# Patient Record
Sex: FEMALE | Race: WHITE | NOT HISPANIC OR LATINO | Employment: UNEMPLOYED | URBAN - METROPOLITAN AREA
[De-identification: names, ages, dates, MRNs, and addresses within clinical notes are randomized per-mention and may not be internally consistent; named-entity substitution may affect disease eponyms.]

---

## 2017-03-16 ENCOUNTER — TELEPHONE (OUTPATIENT)
Dept: OBSTETRICS AND GYNECOLOGY | Facility: CLINIC | Age: 37
End: 2017-03-16

## 2017-03-16 RX ORDER — IBUPROFEN 800 MG/1
400 TABLET ORAL EVERY 8 HOURS PRN
Qty: 100 TABLET | Refills: 0 | Status: SHIPPED | OUTPATIENT
Start: 2017-03-16 | End: 2017-07-28 | Stop reason: SDUPTHER

## 2017-03-16 NOTE — TELEPHONE ENCOUNTER
----- Message from Mini Rodriges sent at 3/16/2017  4:02 PM EDT -----  Regarding: Prescription Question  Contact: [phone]  I had endometrial ablation in September. Dr. Bautista prescribed ibuprofen 800mg. I do not have any more of this medication and have been having severe cramping around the time of when my menstrual cycle should be. Could you please call me in some ibuprofen 800mg? Thank you

## 2017-07-31 RX ORDER — IBUPROFEN 800 MG/1
TABLET ORAL
Qty: 45 TABLET | Refills: 0 | Status: SHIPPED | OUTPATIENT
Start: 2017-07-31

## 2019-03-11 ENCOUNTER — TRANSCRIBE ORDERS (OUTPATIENT)
Dept: ADMINISTRATIVE | Facility: HOSPITAL | Age: 39
End: 2019-03-11

## 2019-03-11 DIAGNOSIS — R13.10 DYSPHAGIA, UNSPECIFIED TYPE: Primary | ICD-10-CM

## 2019-03-15 ENCOUNTER — HOSPITAL ENCOUNTER (OUTPATIENT)
Dept: GENERAL RADIOLOGY | Facility: HOSPITAL | Age: 39
Discharge: HOME | End: 2019-03-15
Admitting: INTERNAL MEDICINE

## 2019-03-15 DIAGNOSIS — R13.10 DYSPHAGIA, UNSPECIFIED TYPE: ICD-10-CM

## 2019-03-15 PROCEDURE — 92611 MOTION FLUOROSCOPY/SWALLOW: CPT | Performed by: SPEECH-LANGUAGE PATHOLOGIST

## 2019-03-15 PROCEDURE — 74230 X-RAY XM SWLNG FUNCJ C+: CPT

## 2019-03-15 RX ADMIN — BARIUM SULFATE 65 ML: 960 POWDER, FOR SUSPENSION ORAL at 10:39

## 2019-03-15 RX ADMIN — BARIUM SULFATE 4 ML: 980 POWDER, FOR SUSPENSION ORAL at 10:39

## 2019-03-15 NOTE — MBS/VFSS/FEES
Outpatient Speech Language Pathology   Adult Swallow Initial Evaluation-VFSS  [facility]     Patient Name: Mini Rodriges  : 1980  MRN: [id number]  Today's Date: 3/15/2019         Visit Date: 03/15/2019     SPEECH-LANGUAGE PATHOLOGY EVALUTION - VFSS  Subjective: The patient was seen on this date for a VFSS(Videofluoroscopic Swallowing Study).  Patient was alert and cooperative.    Significant history: Pt c/o food sticking, regurgitation at times, and pain with swallow.  Objective: Risks/benefits were reviewed with the patient, and consent was obtained. The study was completed with SLP present and Radiologist review. The patient was seen in lateral view(s). Textures given included thin liquid, puree consistency, mechanical soft consistency and regular consistency.  Assessment:  Pt demonstrated functional oropharyngeal swallow.  Premature spillage of all consistencies.  Trace penetration noted with thin by cup which cleared with the swallow.  No significant oral or pharyngeal residue.  Dry solid noted to halt at C7-T1 requiring second swallow.  Stasis of bolus noted in mid-lower esophagus.  Liquid wash cleared stasis.  SLP Findings: Patient presents with functional oropharyngeal dysphagia with esophageal component.   Recommendations: Diet Textures: thin liquid, soft, moist foods food. Medications should be taken as tolerated.Recommended Strategies: Upright for PO, small bites and sips, double swallow with foods and alternate liquids and solids. Oral care before breakfast, after all meals and PRN.  Other Recommended Evaluations: Consider dedicated esophageal studies (ie. Esophagram, UGI)  and/or GI consult  Dysphagia therapy is not recommended. Rationale: functional oropharyngeal dysphagia.         Patient Active Problem List   Diagnosis   • Irregular bleeding   • History of genital warts   • High risk HPV infection        Past Medical History:   Diagnosis Date   • Depression    • Depression    • GERD  (gastroesophageal reflux disease)    • History of genital warts    • Menses painful     AND VERY HEAVY        Past Surgical History:   Procedure Laterality Date   • D&C HYSTEROSCOPY ENDOMETRIAL ABLATION N/A 9/16/2016    Procedure: DILATATION AND CURETTAGE HYSTEROSCOPY NOVASURE ENDOMETRIAL ABLATION;  Surgeon: Fco Bautista MD;  Location: [facility] OR;  Service:    • POSTPARTUM TUBAL LIGATION           Visit Dx:     ICD-10-CM ICD-9-CM   1. Dysphagia, unspecified type R13.10 787.20           SLP Adult Swallow Evaluation - 03/15/19 1015        Rehab Evaluation    Document Type  evaluation   -SA    Subjective Information  complains of food sticking   -SA    Patient Observations  alert;cooperative   -SA    Patient Effort  good   -SA    Symptoms Noted During/After Treatment  none   -SA       General Information    Patient Profile Reviewed  yes   -SA    Pertinent History Of Current Problem  Pt rpts frequent choking and occasional regurgitation of food, pain w/ swallowing; tx fro post traumatic stres d/o and depression/anxiety, Chiari malformation   -SA    Current Method of Nutrition  regular textures;thin liquids   -SA    Precautions/Limitations, Vision  WFL   -SA    Precautions/Limitations, Hearing  WFL   -SA    Prior Level of Function-Communication  WFL   -SA    Prior Level of Function-Swallowing  no diet consistency restrictions   -SA    Plans/Goals Discussed with  patient   -SA    Barriers to Rehab  none identified   -SA       Pain Assessment    Additional Documentation  Pain Scale: Numbers Pre/Post-Treatment (Group)   -SA       Pain Scale: Numbers Pre/Post-Treatment    Pain Scale: Numbers, Pretreatment  0/10 - no pain   -SA    Pain Scale: Numbers, Post-Treatment  0/10 - no pain   -SA       MBS/VFSS    Utensils Used  spoon;cup;straw   -SA    Consistencies Trialed  regular textures;soft textures;pureed;thin liquids   -SA       MBS/VFSS Interpretation    Oral Prep Phase  WFL   -SA    Oral Transit Phase  impaired    -SA    Oral Residue  WFL   -SA       Oral Transit Phase    Impaired Oral Transit Phase  premature spillage of liquids into pharynx   -SA    Premature Spillage of Liquids into Pharynx  thin liquids;pudding/puree;mixed consistency   -SA       Initiation of Pharyngeal Swallow    Pharyngeal Phase  functional pharyngeal phase of swallowing   -SA    Pharyngeal Phase, Comment  Trace, transient penetration with thin by cup only.   -SA       Esophageal Phase    Esophageal Phase  esophageal retention;other (see comments) stasis of dry solid momentarily at C7-TI, then mid-lwr esoph   -SA    Esophageal Phase, Comment  stasis of dry solid momentarily at C7-TI, then mid-lwr esophagus requiring liquid wash to clear   -SA       SLP Communication to Radiology    Summary Statement  Pt demonstrated functional oropharyngeal swallow.  Premature spillage of all consistencies.  Trace penetration noted with thin by cup which cleared with the swallow.  No significant oral or pharyngeal residue.  Dry solid noted to halt at C7-T1 requiring second swallow.  Stasis of bolus noted in mid-lower esophagus.  Liquid wash cleared stasis.   -SA       Clinical Impression    SLP Swallowing Diagnosis  esophageal dysfunction   -SA    Functional Impact  risk of aspiration/pneumonia   -    Swallow Criteria for Skilled Therapeutic Interventions Met  no problems identified which require skilled intervention   -       Recommendations    Therapy Frequency (Swallow)  evaluation only   -SA    SLP Diet Recommendation  soft textures;thin liquids   -SA    Recommended Precautions and Strategies  upright posture during/after eating;small bites of food and sips of liquid;multiple swallows per bite of food;alternate between small bites of food and sips of liquid   -SA    SLP Rec. for Method of Medication Administration  as tolerated   -SA      User Key  (r) = Recorded By, (t) = Taken By, (c) = Cosigned By    Initials Name Provider Type     Ada Galloway MS  CCC-SLP Speech and Language Pathologist                        OP SLP Education     Row Name 03/15/19 1259       Education    Barriers to Learning  No barriers identified  -    Education Provided  Described results of evaluation;Patient expressed understanding of evaluation  -    Assessed  Learning motivation;Learning needs  -    Learning Motivation  Strong  -    Learning Method  Explanation  -    Teaching Response  Verbalized understanding  -      User Key  (r) = Recorded By, (t) = Taken By, (c) = Cosigned By    Initials Name Effective Dates    Ada Brice MS CCC-SLP 03/07/18 -               OP SLP Assessment/Plan - 03/15/19 1258        SLP Assessment    Functional Problems  Swallowing   -    Impact on Function: Swallowing  Risk of aspiration;Risk of pneumonia   -    Clinical Impression: Swallowing  esophageal dysphagia   -    SLP Diagnosis  WFL oropharyngeal swallow; esophageal dysphagia   -SA    Prognosis  Good (comment)   -SA    Patient/caregiver participated in establishment of treatment plan and goals  Yes   -SA    Patient would benefit from skilled therapy intervention  No   -SA       SLP Plan    Frequency  eval only   -SA      User Key  (r) = Recorded By, (t) = Taken By, (c) = Cosigned By    Initials Name Provider Type    Ada Brice MS CCC-SLP Speech and Language Pathologist              SLP Outcome Measures (last 72 hours)      SLP Outcome Measures     Row Name 03/15/19 1015             SLP Outcome Measures    Outcome Measure Used?  Adult NOMS  -SA         Adult FCM Scores    FCM Chosen  Swallowing  -      Swallowing FCM Score  6  -SA        User Key  (r) = Recorded By, (t) = Taken By, (c) = Cosigned By    Initials Name Effective Dates    Ada Brice MS CCC-SLP 03/07/18 -                Time Calculation:   SLP Start Time: 1015  SLP Stop Time: 1115  SLP Time Calculation (min): 60 min    Therapy Charges for Today     Code Description Service Date Service Provider  Modifiers Qty    [id number] HC ST MOTION FLUORO EVAL SWALLOW 4 3/15/2019 Ada Galloway, MS CCC-SLP GN 1                   Ada Galloway MS CCC-SLP  3/15/2019

## 2019-05-21 ENCOUNTER — INPATIENT HOSPITAL (OUTPATIENT)
Dept: URBAN - METROPOLITAN AREA HOSPITAL 107 | Facility: HOSPITAL | Age: 39
End: 2019-05-21
Payer: COMMERCIAL

## 2019-05-21 DIAGNOSIS — K52.9 NONINFECTIVE GASTROENTERITIS AND COLITIS, UNSPECIFIED: ICD-10-CM

## 2019-05-21 DIAGNOSIS — R10.9 UNSPECIFIED ABDOMINAL PAIN: ICD-10-CM

## 2019-05-21 DIAGNOSIS — K80.80 OTHER CHOLELITHIASIS WITHOUT OBSTRUCTION: ICD-10-CM

## 2019-05-21 DIAGNOSIS — N28.9 DISORDER OF KIDNEY AND URETER, UNSPECIFIED: ICD-10-CM

## 2019-05-21 PROCEDURE — 99223 1ST HOSP IP/OBS HIGH 75: CPT | Performed by: INTERNAL MEDICINE

## 2019-05-22 ENCOUNTER — INPATIENT HOSPITAL (OUTPATIENT)
Dept: URBAN - METROPOLITAN AREA HOSPITAL 107 | Facility: HOSPITAL | Age: 39
End: 2019-05-22
Payer: COMMERCIAL

## 2019-05-22 DIAGNOSIS — R19.7 DIARRHEA, UNSPECIFIED: ICD-10-CM

## 2019-05-22 DIAGNOSIS — R11.2 NAUSEA WITH VOMITING, UNSPECIFIED: ICD-10-CM

## 2019-05-22 DIAGNOSIS — R10.9 UNSPECIFIED ABDOMINAL PAIN: ICD-10-CM

## 2019-05-22 DIAGNOSIS — A04.72 ENTEROCOLITIS DUE TO CLOSTRIDIUM DIFFICILE, NOT SPECIFIED AS: ICD-10-CM

## 2019-05-22 PROCEDURE — 99232 SBSQ HOSP IP/OBS MODERATE 35: CPT | Performed by: PHYSICIAN ASSISTANT

## 2019-05-24 PROCEDURE — 99231 SBSQ HOSP IP/OBS SF/LOW 25: CPT | Performed by: PHYSICIAN ASSISTANT

## 2019-05-29 ENCOUNTER — OFFICE (OUTPATIENT)
Dept: URBAN - METROPOLITAN AREA CLINIC 75 | Facility: CLINIC | Age: 39
End: 2019-05-29
Payer: COMMERCIAL

## 2019-05-29 VITALS
SYSTOLIC BLOOD PRESSURE: 112 MMHG | DIASTOLIC BLOOD PRESSURE: 62 MMHG | HEART RATE: 86 BPM | WEIGHT: 177 LBS | HEIGHT: 66 IN

## 2019-05-29 DIAGNOSIS — K52.9 NONINFECTIVE GASTROENTERITIS AND COLITIS, UNSPECIFIED: ICD-10-CM

## 2019-05-29 DIAGNOSIS — A04.72 ENTEROCOLITIS DUE TO CLOSTRIDIUM DIFFICILE, NOT SPECIFIED AS: ICD-10-CM

## 2019-05-29 DIAGNOSIS — B18.2 CHRONIC VIRAL HEPATITIS C: ICD-10-CM

## 2019-05-29 DIAGNOSIS — K21.9 GASTRO-ESOPHAGEAL REFLUX DISEASE WITHOUT ESOPHAGITIS: ICD-10-CM

## 2019-05-29 DIAGNOSIS — R14.2 ERUCTATION: ICD-10-CM

## 2019-05-29 DIAGNOSIS — R19.7 DIARRHEA, UNSPECIFIED: ICD-10-CM

## 2019-05-29 PROCEDURE — 99214 OFFICE O/P EST MOD 30 MIN: CPT | Performed by: INTERNAL MEDICINE

## 2019-05-29 RX ORDER — OMEPRAZOLE 40 MG/1
CAPSULE, DELAYED RELEASE ORAL
Qty: 30 | Refills: 11 | Status: ACTIVE

## 2020-09-04 ENCOUNTER — APPOINTMENT (OUTPATIENT)
Dept: CT IMAGING | Facility: HOSPITAL | Age: 40
End: 2020-09-04

## 2020-09-04 ENCOUNTER — HOSPITAL ENCOUNTER (EMERGENCY)
Facility: HOSPITAL | Age: 40
Discharge: HOME | End: 2020-09-04
Attending: EMERGENCY MEDICINE | Admitting: EMERGENCY MEDICINE

## 2020-09-04 VITALS
HEART RATE: 65 BPM | WEIGHT: 172 LBS | OXYGEN SATURATION: 100 % | SYSTOLIC BLOOD PRESSURE: 130 MMHG | RESPIRATION RATE: 17 BRPM | DIASTOLIC BLOOD PRESSURE: 80 MMHG | HEIGHT: 66 IN | TEMPERATURE: 98.4 F | BODY MASS INDEX: 27.64 KG/M2

## 2020-09-04 DIAGNOSIS — R11.2 NON-INTRACTABLE VOMITING WITH NAUSEA, UNSPECIFIED VOMITING TYPE: Primary | ICD-10-CM

## 2020-09-04 LAB
ALBUMIN SERPL-MCNC: 4.3 G/DL (ref 3.5–5.2)
ALBUMIN/GLOB SERPL: 1.3 G/DL
ALP SERPL-CCNC: 59 U/L (ref 39–117)
ALT SERPL W P-5'-P-CCNC: 14 U/L (ref 1–33)
ANION GAP SERPL CALCULATED.3IONS-SCNC: 11.5 MMOL/L (ref 5–15)
AST SERPL-CCNC: 14 U/L (ref 1–32)
BASOPHILS # BLD AUTO: 0.02 10*3/MM3 (ref 0–0.2)
BASOPHILS NFR BLD AUTO: 0.2 % (ref 0–1.5)
BILIRUB SERPL-MCNC: 0.3 MG/DL (ref 0–1.2)
BILIRUB UR QL STRIP: NEGATIVE
BUN SERPL-MCNC: 12 MG/DL (ref 6–20)
BUN/CREAT SERPL: 15.8 (ref 7–25)
CALCIUM SPEC-SCNC: 9.3 MG/DL (ref 8.6–10.5)
CHLORIDE SERPL-SCNC: 103 MMOL/L (ref 98–107)
CLARITY UR: CLEAR
CO2 SERPL-SCNC: 24.5 MMOL/L (ref 22–29)
COLOR UR: YELLOW
CREAT SERPL-MCNC: 0.76 MG/DL (ref 0.57–1)
DEPRECATED RDW RBC AUTO: 43.7 FL (ref 37–54)
EOSINOPHIL # BLD AUTO: 0 10*3/MM3 (ref 0–0.4)
EOSINOPHIL NFR BLD AUTO: 0 % (ref 0.3–6.2)
ERYTHROCYTE [DISTWIDTH] IN BLOOD BY AUTOMATED COUNT: 13.8 % (ref 12.3–15.4)
GFR SERPL CREATININE-BSD FRML MDRD: 84 ML/MIN/1.73
GLOBULIN UR ELPH-MCNC: 3.3 GM/DL
GLUCOSE SERPL-MCNC: 93 MG/DL (ref 65–99)
GLUCOSE UR STRIP-MCNC: NEGATIVE MG/DL
HCG SERPL QL: NEGATIVE
HCT VFR BLD AUTO: 38.6 % (ref 34–46.6)
HGB BLD-MCNC: 12.6 G/DL (ref 12–15.9)
HGB UR QL STRIP.AUTO: NEGATIVE
HOLD SPECIMEN: NORMAL
IMM GRANULOCYTES # BLD AUTO: 0.02 10*3/MM3 (ref 0–0.05)
IMM GRANULOCYTES NFR BLD AUTO: 0.2 % (ref 0–0.5)
KETONES UR QL STRIP: ABNORMAL
LEUKOCYTE ESTERASE UR QL STRIP.AUTO: NEGATIVE
LIPASE SERPL-CCNC: 24 U/L (ref 13–60)
LYMPHOCYTES # BLD AUTO: 1.68 10*3/MM3 (ref 0.7–3.1)
LYMPHOCYTES NFR BLD AUTO: 19.5 % (ref 19.6–45.3)
MCH RBC QN AUTO: 28.1 PG (ref 26.6–33)
MCHC RBC AUTO-ENTMCNC: 32.6 G/DL (ref 31.5–35.7)
MCV RBC AUTO: 86.2 FL (ref 79–97)
MONOCYTES # BLD AUTO: 0.55 10*3/MM3 (ref 0.1–0.9)
MONOCYTES NFR BLD AUTO: 6.4 % (ref 5–12)
NEUTROPHILS NFR BLD AUTO: 6.36 10*3/MM3 (ref 1.7–7)
NEUTROPHILS NFR BLD AUTO: 73.7 % (ref 42.7–76)
NITRITE UR QL STRIP: NEGATIVE
NRBC BLD AUTO-RTO: 0 /100 WBC (ref 0–0.2)
PH UR STRIP.AUTO: 6 [PH] (ref 5–8)
PLATELET # BLD AUTO: 246 10*3/MM3 (ref 140–450)
PMV BLD AUTO: 10.5 FL (ref 6–12)
POTASSIUM SERPL-SCNC: 3.5 MMOL/L (ref 3.5–5.2)
PROT SERPL-MCNC: 7.6 G/DL (ref 6–8.5)
PROT UR QL STRIP: ABNORMAL
RBC # BLD AUTO: 4.48 10*6/MM3 (ref 3.77–5.28)
SODIUM SERPL-SCNC: 139 MMOL/L (ref 136–145)
SP GR UR STRIP: >=1.03 (ref 1–1.03)
TROPONIN T SERPL-MCNC: <0.01 NG/ML (ref 0–0.03)
UROBILINOGEN UR QL STRIP: ABNORMAL
WBC # BLD AUTO: 8.63 10*3/MM3 (ref 3.4–10.8)
WHOLE BLOOD HOLD SPECIMEN: NORMAL
WHOLE BLOOD HOLD SPECIMEN: NORMAL

## 2020-09-04 PROCEDURE — 25010000002 IOPAMIDOL 61 % SOLUTION: Performed by: EMERGENCY MEDICINE

## 2020-09-04 PROCEDURE — 99284 EMERGENCY DEPT VISIT MOD MDM: CPT

## 2020-09-04 PROCEDURE — 80053 COMPREHEN METABOLIC PANEL: CPT

## 2020-09-04 PROCEDURE — 83690 ASSAY OF LIPASE: CPT

## 2020-09-04 PROCEDURE — 74177 CT ABD & PELVIS W/CONTRAST: CPT

## 2020-09-04 PROCEDURE — 96361 HYDRATE IV INFUSION ADD-ON: CPT

## 2020-09-04 PROCEDURE — 36415 COLL VENOUS BLD VENIPUNCTURE: CPT

## 2020-09-04 PROCEDURE — 25010000002 PROCHLORPERAZINE 10 MG/2ML SOLUTION: Performed by: EMERGENCY MEDICINE

## 2020-09-04 PROCEDURE — 96374 THER/PROPH/DIAG INJ IV PUSH: CPT

## 2020-09-04 PROCEDURE — 84484 ASSAY OF TROPONIN QUANT: CPT | Performed by: EMERGENCY MEDICINE

## 2020-09-04 PROCEDURE — 81003 URINALYSIS AUTO W/O SCOPE: CPT | Performed by: EMERGENCY MEDICINE

## 2020-09-04 PROCEDURE — 85025 COMPLETE CBC W/AUTO DIFF WBC: CPT

## 2020-09-04 PROCEDURE — 84703 CHORIONIC GONADOTROPIN ASSAY: CPT

## 2020-09-04 RX ORDER — ONDANSETRON 4 MG/1
4 TABLET, ORALLY DISINTEGRATING ORAL EVERY 12 HOURS PRN
Qty: 10 TABLET | Refills: 0 | Status: SHIPPED | OUTPATIENT
Start: 2020-09-04

## 2020-09-04 RX ORDER — SODIUM CHLORIDE 0.9 % (FLUSH) 0.9 %
10 SYRINGE (ML) INJECTION AS NEEDED
Status: DISCONTINUED | OUTPATIENT
Start: 2020-09-04 | End: 2020-09-04 | Stop reason: HOSPADM

## 2020-09-04 RX ORDER — PROCHLORPERAZINE EDISYLATE 5 MG/ML
5 INJECTION INTRAMUSCULAR; INTRAVENOUS ONCE
Status: COMPLETED | OUTPATIENT
Start: 2020-09-04 | End: 2020-09-04

## 2020-09-04 RX ORDER — SODIUM CHLORIDE 9 MG/ML
125 INJECTION, SOLUTION INTRAVENOUS CONTINUOUS
Status: DISCONTINUED | OUTPATIENT
Start: 2020-09-04 | End: 2020-09-04 | Stop reason: HOSPADM

## 2020-09-04 RX ADMIN — IOPAMIDOL 85 ML: 612 INJECTION, SOLUTION INTRAVENOUS at 13:21

## 2020-09-04 RX ADMIN — SODIUM CHLORIDE 1000 ML: 9 INJECTION, SOLUTION INTRAVENOUS at 12:35

## 2020-09-04 RX ADMIN — PROCHLORPERAZINE EDISYLATE 5 MG: 5 INJECTION INTRAMUSCULAR; INTRAVENOUS at 12:39

## 2020-09-04 RX ADMIN — SODIUM CHLORIDE 125 ML/HR: 9 INJECTION, SOLUTION INTRAVENOUS at 14:06

## 2020-09-04 NOTE — ED NOTES
"Pt c/o vomiting that started three days ago She was seen at [facility] yesterday and prescribed Phenergan but has had no improvements so she came here for second opinion. Pt had blood work and \"stomach x-ray\" but no CT scan while at ER yesterday. She denies possible pregnancy.    Mask placed on patient in triage. Triage staff wore appropriate PPE during interaction with patient.        Nasima Gates RN  09/04/20 [id number]    "

## 2020-09-04 NOTE — DISCHARGE INSTRUCTIONS
As we discussed, do not smoke marijuana as that is a possibility contributing factor to your vomiting.  Take Zofran as prescribed.  Start with clear liquids and then advance diet as tolerated.

## 2020-09-04 NOTE — ED NOTES
Pt arrivies for second opinion after seeing [facility] yesterday for vomiting x 3 days. Pt states she received two liters of fluid and ha imaging taken. Pt reports they gave her a prescription for phenergan and compazine but hasn't had any relief. Denies pregnancy.      Pt wearing mask and RN wearing mask and eyewear throughout encounter.      Mary Mixon, RN  09/04/20 [id number]

## 2020-09-04 NOTE — ED PROVIDER NOTES
EMERGENCY DEPARTMENT ENCOUNTER    Room Number:  32/32  Date of encounter:  9/4/2020  PCP: Fco Bautista MD (Inactive)  Historian: Patient      HPI:  Chief Complaint: Vomiting  A complete HPI/ROS/PMH/PSH/SH/FH are unobtainable due to: Not applicable  Context: Mini Rodriges is a 40 y.o. female who presents to the ED c/o 3 days ago started with vomiting.  It occurs after she eats or drinks.  Not tolerating much food at all.  He is tolerating a small amount of liquids.  She has had no diarrhea.  She has had no fevers, chills, shortness of breath, chest pain, any focal weakness or sensory changes to extremity, headaches, dysuria, or any urinary frequency or urgency.  She has no abdominal pain now.  At times with the vomiting she will have a little bit of crampy pain in her abdomen which is typical for past vomiting episodes in the past.  Again for the most part other than vomiting she does not have any abdominal pain.  She is status post in the distant past endometrial ablation and she no longer menstruates.  She is never had gallbladder surgery or appendix surgery.  Denies any other surgery on her abdomen.  She is a former smoker.  She does smoke marijuana a few times a week.  She does not drink alcohol.  She reports that she was at [facility] yesterday.  They said she was dehydrated gave her 2 L of fluids as well as Phenergan and she was discharged home.        Previous Episodes: No  Current Symptoms: See above    MEDICAL HISTORY REVIEWED  Patient saw family medicine doctor July 15, 2020 and had a diagnosis of gastroenteritis.      PAST MEDICAL HISTORY  Active Ambulatory Problems     Diagnosis Date Noted   • Irregular bleeding 08/18/2016   • History of genital warts 08/18/2016   • High risk HPV infection 08/18/2016     Resolved Ambulatory Problems     Diagnosis Date Noted   • No Resolved Ambulatory Problems     Past Medical History:   Diagnosis Date   • Depression    • Depression    • GERD  (gastroesophageal reflux disease)    • Menses painful          PAST SURGICAL HISTORY  Past Surgical History:   Procedure Laterality Date   • D&C HYSTEROSCOPY ENDOMETRIAL ABLATION N/A 2016    Procedure: DILATATION AND CURETTAGE HYSTEROSCOPY NOVASURE ENDOMETRIAL ABLATION;  Surgeon: Fco Bautista MD;  Location: [facility];  Service:    • POSTPARTUM TUBAL LIGATION           FAMILY HISTORY  Family History   Problem Relation Age of Onset   • Breast cancer Mother 45   • Cervical cancer Mother    • Lung cancer Maternal Grandmother          SOCIAL HISTORY  Social History     Socioeconomic History   • Marital status: Single     Spouse name: Not on file   • Number of children: Not on file   • Years of education: Not on file   • Highest education level: Not on file   Tobacco Use   • Smoking status: Former Smoker     Packs/day: 0.50     Years: 20.00     Pack years: 10.00     Types: Cigarettes     Last attempt to quit: 3/4/2019     Years since quittin.5   • Smokeless tobacco: Never Used   Substance and Sexual Activity   • Alcohol use: No   • Drug use: No   • Sexual activity: Defer         ALLERGIES  Patient has no known allergies.        REVIEW OF SYSTEMS  Review of Systems     All systems reviewed and negative except for those discussed in HPI.       PHYSICAL EXAM    I have reviewed the triage vital signs and nursing notes.    ED Triage Vitals   Temp Heart Rate Resp BP SpO2   20 1052 20 1052 20 1052 20 1120 20 1052   98.4 °F (36.9 °C) 87 16 125/90 99 %      Temp src Heart Rate Source Patient Position BP Location FiO2 (%)   20 1052 -- -- -- --   Tympanic           GENERAL: Pleasant female no acute distress.Vital signs on my initial evaluation O2 sats 100% on room air on my evaluation.  Vital signs are normal.  Patient is able to stand and walk.  HENT: nares patent  Head/neck/ face are symmetric without gross deformity, signs of trauma, or swelling  EYES: no scleral icterus, no  conjunctival pallor.  NECK: Supple, no meningismus  CV: regular rhythm, regular rate with intact distal pulses.  No murmur rub  RESPIRATORY: normal effort and no respiratory distress.  Clear to auscultation bilaterally  ABDOMEN: soft and non-tender.  Normal bowel sounds.  No Kay sign or right upper quadrant tenderness.  No tenderness on her abdomen at all.  MUSCULOSKELETAL: no deformity.  No edema with good strong intact distal pulses to her upper and lower extremities bilaterally  NEURO: alert and appropriate, moves all extremities, follows commands.  Alert and oriented x3.  Cranial nerves II through XII are grossly intact and there is no focal motor or sensory changes  SKIN: warm, dry    Vital signs and nursing notes reviewed.        LAB RESULTS  Recent Results (from the past 24 hour(s))   Comprehensive Metabolic Panel    Collection Time: 09/04/20 11:24 AM   Result Value Ref Range    Glucose 93 65 - 99 mg/dL    BUN 12 6 - 20 mg/dL    Creatinine 0.76 0.57 - 1.00 mg/dL    Sodium 139 136 - 145 mmol/L    Potassium 3.5 3.5 - 5.2 mmol/L    Chloride 103 98 - 107 mmol/L    CO2 24.5 22.0 - 29.0 mmol/L    Calcium 9.3 8.6 - 10.5 mg/dL    Total Protein 7.6 6.0 - 8.5 g/dL    Albumin 4.30 3.50 - 5.20 g/dL    ALT (SGPT) 14 1 - 33 U/L    AST (SGOT) 14 1 - 32 U/L    Alkaline Phosphatase 59 39 - 117 U/L    Total Bilirubin 0.3 0.0 - 1.2 mg/dL    eGFR Non African Amer 84 >60 mL/min/1.73    Globulin 3.3 gm/dL    A/G Ratio 1.3 g/dL    BUN/Creatinine Ratio 15.8 7.0 - 25.0    Anion Gap 11.5 5.0 - 15.0 mmol/L   Lipase    Collection Time: 09/04/20 11:24 AM   Result Value Ref Range    Lipase 24 13 - 60 U/L   hCG, Serum, Qualitative    Collection Time: 09/04/20 11:24 AM   Result Value Ref Range    HCG Qualitative Negative Negative   Light Blue Top    Collection Time: 09/04/20 11:24 AM   Result Value Ref Range    Extra Tube hold for add-on    Green Top (Gel)    Collection Time: 09/04/20 11:24 AM   Result Value Ref Range    Extra Tube Hold  for add-ons.    Lavender Top    Collection Time: 09/04/20 11:24 AM   Result Value Ref Range    Extra Tube hold for add-on    CBC Auto Differential    Collection Time: 09/04/20 11:24 AM   Result Value Ref Range    WBC 8.63 3.40 - 10.80 10*3/mm3    RBC 4.48 3.77 - 5.28 10*6/mm3    Hemoglobin 12.6 12.0 - 15.9 g/dL    Hematocrit 38.6 34.0 - 46.6 %    MCV 86.2 79.0 - 97.0 fL    MCH 28.1 26.6 - 33.0 pg    MCHC 32.6 31.5 - 35.7 g/dL    RDW 13.8 12.3 - 15.4 %    RDW-SD 43.7 37.0 - 54.0 fl    MPV 10.5 6.0 - 12.0 fL    Platelets 246 140 - 450 10*3/mm3    Neutrophil % 73.7 42.7 - 76.0 %    Lymphocyte % 19.5 (L) 19.6 - 45.3 %    Monocyte % 6.4 5.0 - 12.0 %    Eosinophil % 0.0 (L) 0.3 - 6.2 %    Basophil % 0.2 0.0 - 1.5 %    Immature Grans % 0.2 0.0 - 0.5 %    Neutrophils, Absolute 6.36 1.70 - 7.00 10*3/mm3    Lymphocytes, Absolute 1.68 0.70 - 3.10 10*3/mm3    Monocytes, Absolute 0.55 0.10 - 0.90 10*3/mm3    Eosinophils, Absolute 0.00 0.00 - 0.40 10*3/mm3    Basophils, Absolute 0.02 0.00 - 0.20 10*3/mm3    Immature Grans, Absolute 0.02 0.00 - 0.05 10*3/mm3    nRBC 0.0 0.0 - 0.2 /100 WBC   Troponin    Collection Time: 09/04/20 11:24 AM   Result Value Ref Range    Troponin T <0.010 0.000 - 0.030 ng/mL   Urinalysis With Microscopic If Indicated (No Culture) - Urine, Clean Catch    Collection Time: 09/04/20 12:34 PM   Result Value Ref Range    Color, UA Yellow Yellow, Straw    Appearance, UA Clear Clear    pH, UA 6.0 5.0 - 8.0    Specific Gravity, UA >=1.030 1.005 - 1.030    Glucose, UA Negative Negative    Ketones, UA 80 mg/dL (3+) (A) Negative    Bilirubin, UA Negative Negative    Blood, UA Negative Negative    Protein, UA Trace (A) Negative    Leuk Esterase, UA Negative Negative    Nitrite, UA Negative Negative    Urobilinogen, UA 1.0 E.U./dL 0.2 - 1.0 E.U./dL       Ordered the above labs and independently reviewed the results.        RADIOLOGY  Ct Abdomen Pelvis With Contrast    Result Date: 9/4/2020  CT ABDOMEN AND PELVIS WITH  IV CONTRAST  HISTORY: 40-year-old female with vomiting.  TECHNIQUE: Radiation dose reduction techniques were utilized, including automated exposure control and exposure modulation based on body size. 3 mm images were obtained through the abdomen and pelvis after the administration of IV contrast. There is no previous CT for comparison.  FINDINGS: There is sludge and likely tiny gallstones within the gallbladder without evidence for cholecystitis and there is no biliary dilatation. The liver appears unremarkable other than a tiny cyst. Splenic size is normal. The pancreas, adrenals, and left kidney appear unremarkable. There are multiple right renal parenchymal scars. Urinary bladder is collapsed. There is a paucity of formed stool within the colon and there are long segments of colon which are completely collapsed. There is no definitive colonic thickening. There is a long segment of distal ileum which appears slightly thickened, but there is no surrounding inflammatory change or fluid. The appendix appears within normal limits. Noncontrasted uterus and adnexa appear unremarkable. There is no free fluid or lymphadenopathy. There are no airspace opacities or effusions at the visualized lower lung fields.      1. Most of the colon is collapsed, but there is no definitive colonic thickening to suggest colitis. There is possibly an enteritis involving the distal ileum, but this is uncertain. Please correlate clinically and follow-up as needed. 2. Likely sludge and tiny stones within the gallbladder without evidence for cholecystitis and there is no biliary dilatation.  Discussed with Dr. Irving.   CHECK CHECK        I ordered the above noted radiological studies. Reviewed by me and discussed with radiologist.  See dictation for official radiology interpretation.      PROCEDURES    Procedures      MEDICATIONS GIVEN IN ER    Medications   sodium chloride 0.9 % flush 10 mL (has no administration in time range)   sodium  chloride 0.9 % infusion (0 mL/hr Intravenous Stopped 9/4/20 1536)   sodium chloride 0.9 % bolus 1,000 mL (0 mL Intravenous Stopped 9/4/20 1400)   prochlorperazine (COMPAZINE) injection 5 mg (5 mg Intravenous Given 9/4/20 1239)   iopamidol (ISOVUE-300) 61 % injection 100 mL (85 mL Intravenous Given by Other 9/4/20 1321)         PROGRESS, DATA ANALYSIS, CONSULTS, AND MEDICAL DECISION MAKING    Her lab work here is unremarkable.  We can check a urinalysis and a menstrual troponin on there.  I am get a CT scan her abdomen pelvis.  It is possible that this is hyperemesis from cannabinoid.  We will go and treated with some fluids and Phenergan.  All questions are answered.  She is currently very stable.  We are currently under a pandemic from the COVID19 infection.  The patient presented to the emergency department by ambulance or personal vehicle.  During current hospital restrictions no other visitors were present in the emergency department during my evaluation and treatment. I followed the current protocols required by Infection Control at [facility] in my evaluation and treatment of the patient. The patient was wearing a face mask during my evaluation and throughout my encounter. During my whole encounter with this patient I used appropriate personal protective equipment.  This equipment consisted of eye protection, facemask, gown, and gloves.  I applied this equipment before entering the room.      All labs have been independently reviewed by me.  All radiology studies have been reviewed by me and discussed with radiologist dictating the report.   EKG's independently viewed and interpreted by me.  Discussion below represents my analysis of pertinent findings related to patient's condition, differential diagnosis, treatment plan and final disposition.      ED Course as of Sep 04 1537   Fri Sep 04, 2020   1410 I discussed the results of the CT scan of the abdomen pelvis with Dr. Moore.  No acute  abnormality seen.  She has a little bit of sludge and some gallstones.  No biliary ductal dilatation and no signs of cholecystitis.She has normal liver function tests and she has no abdominal pain.    [MM]   1535 On my reevaluation patient has not had vomiting in the emergency department.  Has had some nausea.  Repeat exam is benign.  Abdomen is soft and nontender.  I spoke at length with the patient as well as significant other in the room.  I do not know the definitive the etiology of the patient's vomiting.  She is not dehydrated.  I explained to her about the ketones in the urine.  I also instructed her to not smoke marijuana as that could be a contributing factor of her nausea and vomiting.  She understands and agrees with that plan.  She has Phenergan and I will add some Zofran to her regime as an outpatient.  All questions answered and the patient and significant other agree with this plan.    [MM]      ED Course User Index  [MM] Margarito Irving MD       AS OF 15:37 VITALS:    BP - 122/73  HR - 62  TEMP - 98.4 °F (36.9 °C) (Tympanic)  02 SATS - 99%        DIAGNOSIS  Final diagnoses:   Non-intractable vomiting with nausea, unspecified vomiting type         DISPOSITION  DISCHARGE    Patient discharged in stable condition.    Reviewed implications of results, diagnosis, meds, responsibility to follow up, warning signs and symptoms of possible worsening, potential complications and reasons to return to ER, including worsening of symptoms, not tolerating any liquids, fever, develop bad abdominal pain, or any concerns.    Patient/Family voiced understanding of above instructions.    Discussed plan for discharge, as there is no emergent indication for admission. Pt/family is agreeable and understands need for follow up and repeat testing.  Pt is aware that discharge does not mean that nothing is wrong but it indicates no emergency is present that requires admission and they must continue care with follow-up as given  below or physician of their choice.     FOLLOW-UP  Fco Bautista MD  [address]  [place] [address]  [phone]    In 3 days  Return if not able to tolerate any liquids, fever, develop abdominal pain, or any concerns.         Medication List      New Prescriptions    ondansetron ODT 4 MG disintegrating tablet  Commonly known as:  ZOFRAN-ODT  Place 1 tablet on the tongue Every 12 (Twelve) Hours As Needed for Nausea   or Vomiting for up to 10 doses.                     Margarito Irving MD  09/04/20 [id number]

## 2021-03-31 ENCOUNTER — BULK ORDERING (OUTPATIENT)
Dept: CASE MANAGEMENT | Facility: OTHER | Age: 41
End: 2021-03-31

## 2021-03-31 DIAGNOSIS — Z23 IMMUNIZATION DUE: ICD-10-CM
